# Patient Record
Sex: MALE
[De-identification: names, ages, dates, MRNs, and addresses within clinical notes are randomized per-mention and may not be internally consistent; named-entity substitution may affect disease eponyms.]

---

## 2021-02-13 ENCOUNTER — HOSPITAL ENCOUNTER (EMERGENCY)
Dept: HOSPITAL 56 - MW.ED | Age: 41
Discharge: HOME | End: 2021-02-13
Payer: COMMERCIAL

## 2021-02-13 DIAGNOSIS — Z20.822: ICD-10-CM

## 2021-02-13 DIAGNOSIS — F17.210: ICD-10-CM

## 2021-02-13 DIAGNOSIS — K80.70: Primary | ICD-10-CM

## 2021-02-13 LAB
BUN SERPL-MCNC: 17 MG/DL (ref 7–18)
CHLORIDE SERPL-SCNC: 106 MMOL/L (ref 98–107)
CO2 SERPL-SCNC: 24.9 MMOL/L (ref 21–32)
FLUAV RNA UPPER RESP QL NAA+PROBE: NEGATIVE
FLUBV RNA UPPER RESP QL NAA+PROBE: NEGATIVE
GLUCOSE SERPL-MCNC: 128 MG/DL (ref 74–106)
LIPASE SERPL-CCNC: 89 U/L (ref 73–393)
POTASSIUM SERPL-SCNC: 3.9 MMOL/L (ref 3.5–5.1)
SARS-COV-2 RNA RESP QL NAA+PROBE: NEGATIVE
SODIUM SERPL-SCNC: 142 MMOL/L (ref 136–148)

## 2021-02-13 PROCEDURE — 81003 URINALYSIS AUTO W/O SCOPE: CPT

## 2021-02-13 PROCEDURE — 85025 COMPLETE CBC W/AUTO DIFF WBC: CPT

## 2021-02-13 PROCEDURE — C9113 INJ PANTOPRAZOLE SODIUM, VIA: HCPCS

## 2021-02-13 PROCEDURE — 84484 ASSAY OF TROPONIN QUANT: CPT

## 2021-02-13 PROCEDURE — 96365 THER/PROPH/DIAG IV INF INIT: CPT

## 2021-02-13 PROCEDURE — 0240U: CPT

## 2021-02-13 PROCEDURE — 76705 ECHO EXAM OF ABDOMEN: CPT

## 2021-02-13 PROCEDURE — 36415 COLL VENOUS BLD VENIPUNCTURE: CPT

## 2021-02-13 PROCEDURE — 74177 CT ABD & PELVIS W/CONTRAST: CPT

## 2021-02-13 PROCEDURE — 93005 ELECTROCARDIOGRAM TRACING: CPT

## 2021-02-13 PROCEDURE — 83690 ASSAY OF LIPASE: CPT

## 2021-02-13 PROCEDURE — 96361 HYDRATE IV INFUSION ADD-ON: CPT

## 2021-02-13 PROCEDURE — 96375 TX/PRO/DX INJ NEW DRUG ADDON: CPT

## 2021-02-13 PROCEDURE — 99284 EMERGENCY DEPT VISIT MOD MDM: CPT

## 2021-02-13 PROCEDURE — 80053 COMPREHEN METABOLIC PANEL: CPT

## 2021-02-13 NOTE — US
INDICATION:



Abdominal pain. CT scan earlier showing possible acute cholecystitis.



COMPARISON:



CT of the abdomen and pelvis 02/13/2021.



TECHNIQUE:



Real time gray scale imaging and color Doppler analysis was performed of 

the right upper quadrant.



FINDINGS:



Liver: The liver is normal in size measuring 14.2 cm in length. Increased 

echogenicity compatible with hepatic steatosis. No focal liver lesions.



Gallbladder: There are multiple stones within the gallbladder. The 

gallbladder wall is mildly thickened. No pericholecystic fluid. Positive 

sonographic Mcclain sign.



Bile ducts: No biliary dilation. The common bile duct measures 5 mm in 

diameter.



Pancreas: Normal where seen.



Right kidney: The right kidney measures 10.7 cm in length. No 

hydronephrosis, calculus, or mass.



Right adrenal gland: There is a 2.9 cm solid hyperechoic lesion in the 

right adrenal gland. No internal vascularity. This corresponds with the 

benign myelolipoma seen on CT. 



IMPRESSION:



1. Cholelithiasis with mild gallbladder wall thickening and positive 

sonographic Mcclain sign. Findings are compatible with acute cholecystitis. 



2. Diffuse hepatic steatosis. 



3. 2.9 cm benign myelolipoma in the right adrenal gland.



Dictated by Janessa Burk MD @ Feb 13 2021 11:22PM



Signed by Dr. Janessa Burk @ Feb 13 2021 11:28PM

## 2021-02-13 NOTE — EDM.PDOC
<Elvis Kovacs - Last Filed: 02/13/21 23:39>





ED HPI GENERAL MEDICAL PROBLEM





- General


Chief Complaint: Gastrointestinal Problem


Stated Complaint: ABD PAIN / VOMITING


Time Seen by Provider: 02/13/21 18:15





- Related Data


                                    Allergies











Allergy/AdvReac Type Severity Reaction Status Date / Time


 


No Known Allergies Allergy   Verified 02/13/21 17:57











Home Meds: 


                                    Home Meds





. [No Known Home Meds]  02/13/21 [History]











Departure





- Departure


Time of Disposition: 23:41


Disposition: Home, Self-Care 01


Condition: Good


Clinical Impression: 


Cholelithiasis


Qualifiers:


 Cholelithiasis location: other site Biliary obstruction: without biliary 

obstruction Qualified Code(s): K80.80 - Other cholelithiasis without obstruction








- Discharge Information


*PRESCRIPTION DRUG MONITORING PROGRAM REVIEWED*: Not Applicable


*COPY OF PRESCRIPTION DRUG MONITORING REPORT IN PATIENT HANG: Not Applicable


Instructions:  Cholelithiasis


Referrals: 


Garret Diamond MD [Physician] - 2 Days


Forms:  ED Department Discharge


Additional Instructions: 


Please avoid fatty foods. Please call Dr. Diamond's office on Monday morning to 

arrange a follow-up appointment.





The following information is given to patients seen in the emergency department 

who are being discharged to home. This information is to outline your options 

for follow-up care. We provide all patients seen in our emergency department 

with a follow-up referral.





The need for follow-up, as well as the timing and circumstances, are variable 

depending upon the specifics of your emergency department visit.





If you don't have a primary care physician on staff, we will provide you with a 

referral. We always advise you to contact your personal physician following an 

emergency department visit to inform them of the circumstance of the visit and 

for follow-up with them and/or the need for any referrals to a consulting 

specialist.





The emergency department will also refer you to a specialist when appropriate. 

This referral assures that you have the opportunity for follow-up care with a 

specialist. All of these measure are taken in an effort to provide you with 

optimal care, which includes your follow-up.





Under all circumstances we always encourage you to contact your private 

physician who remains a resource for coordinating your care. When calling for 

follow-up care, please make the office aware that this follow-up is from your 

recent emergency room visit. If for any reason you are refused follow-up, please

 contact the [facility] Emergency 

Department at [phone] and asked to speak to the emergency department 

charge nurse.





- Assessment/Plan


Assessment:: 





Pt received in sign out from prior provider. US with borderline thickening.  Pt 

remains w/out sx in the ED. Pt was evaluated by Dr. Diamond of general surgery in 

the ED.  Given lack of active sx, very minimal leukocytosis and borderline 

imaging and after discussion between Dr. Diamond and the patient plan will be for 

dc with outpatient f/u.





<June Bell - Last Filed: 02/14/21 19:06>





ED HPI GENERAL MEDICAL PROBLEM





- General


Source of Information: Reports: Patient


History Limitations: Reports: No Limitations





- History of Present Illness


INITIAL COMMENTS - FREE TEXT/NARRATIVE: 


HISTORY AND PHYSICAL:





History of present illness:


Is a 40-year-old male presenting to the ED for RUQ abdominal pain x4 hours.  

Patient works nights and notes eating breakfast at 0800 this morning (02/13/21).

  Patient went to bed and then was awoken at 1500 with RUQ abdominal pain and 

feeling nauseous. Patient reports forcing himself to vomit 7 times to relieve 

his symptoms and continues to feel nauseous.  Patient has not eaten or drank 

since 8am today and states symptoms are improving without eating, but feels 

eating may worsen symptoms.  Patient has a h/o of alcohol abuse but states he 

does not drink much anymore as he is dedicated to his work. Denies any other 

symptoms or concerns. Admits to smoking three quarters of a pack of cigarettes 

per day x17 years and admits to drinking a sixpack of light beer per week. 





Patient denies fever, chills, chest pain, shortness of breath, or cough. Denies 

headache, neck stiff ness, change in vision, syncope, or near syncope. Denies 

diarrhea, constipation, or dysuria. Has not noted any blood in urine or stool.





Review of systems: 


As per history of present illness and below otherwise all systems reviewed and 

negative.





Past medical history: 


As per history of present illness and as reviewed below otherwise 

noncontributory.





Surgical history: 


As per history of present illness and as reviewed below otherwise 

noncontributory.





Social history: 


See social history for further information





Family history: 


As per history of present illness and as reviewed below otherwise 

noncontributory.





Physical exam:


General: Patient is alert, oriented, and in no acute distress. Patient laying 

comfortably on exam table. Vitals stable and reviewed by me.


HEENT: Atraumatic, normocephalic, pupils equal and reactive bilaterally, 

negative for conjunctival pallor or scleral icterus, mucous membranes moist, 

neck supple, nontender, trachea midline. No drooling or trismus noted. No 

meningeal signs. No hot potato voice noted. 


Lungs: Clear to auscultation, breath sounds equal bilaterally, chest nontender.


Heart: S1S2, regular rate and rhythm without overt murmur


Abdomen:  Soft, nondistended, RUQ tenderness to palpation, negative rebound. 

Negative for masses or hepatosplenomegaly. Negative for costovertebral 

tenderness.


Genitourinary: Deferred.


Rectal: Deferred.


Skin: Intact, warm, dry. No lesions or rashes noted.


Extremities: Atraumatic, negative for cords or calf pain. Neurovascular 

unremarkable. Cap refill WNL


Neuro: Awake, alert, oriented. Cranial nerves II through XII unremarkable. 

Cerebellum unremarkable. Motor and sensory unremarkable throughout. Exam 

nonfocal.





Notes:


Patient declines pain medications at this time.


I did call and speak to the General Surgeon on call, Dr. Diamond, and thoroughly 

discussed patients case, he requests a RUQ US performed and to call him back 

with results of US. 


Dr. Kovacs has assumed care of patient and will follow remaining diagnostics and

 disposition for patient.





Diagnostics:


CBC, CMP, lipase, ECG, Trop, Abd/Pelvic CT w cont, RUQ US, COVID 19





Therapeutics:


NS, Protonix, Rocephin





Impression:


RUQ abdominal pain





Plan:








Definitive disposition and diagnosis as appropriate pending reevaluation and 

review of above.





  ** abdominal


Pain Score (Numeric/FACES): 3





Past Medical History





- Past Health History


Medical/Surgical History: Denies Medical/Surgical History


HEENT History: Reports: None


Cardiovascular History: Reports: None


Respiratory History: Reports: None


Gastrointestinal History: Reports: None


Genitourinary History: Reports: None


Musculoskeletal History: Reports: None


Neurological History: Reports: None


Psychiatric History: Reports: None


Endocrine/Metabolic History: Reports: None


Dermatologic History: Reports: None





- Infectious Disease History


Infectious Disease History: Reports: None





Social & Family History





- Family History


Family Medical History: No Pertinent Family History





- Tobacco Use


Packs/Tins Daily: 1





- Caffeine Use


Caffeine Use: Reports: Coffee, Energy Drinks





- Recreational Drug Use


Recreational Drug Use Frequency: Socially





ED ROS GENERAL





- Review of Systems


Review Of Systems: Comprehensive ROS is negative, except as noted in HPI.





ED EXAM, GENERAL





- Physical Exam


Exam: See Below (see dictation)





Course





- Vital Signs


Last Recorded V/S: 


                                Last Vital Signs











Temp  97.1 F   02/13/21 21:55


 


Pulse  68   02/13/21 21:55


 


Resp  18   02/13/21 21:55


 


BP  115/79   02/13/21 21:55


 


Pulse Ox  97   02/13/21 21:55














- Orders/Labs/Meds


Orders: 


                               Active Orders 24 hr











 Category Date Time Status


 


 Saline Lock Insert [OM.PC] Stat Oth  02/13/21 18:52 Ordered











Labs: 


                                Laboratory Tests











  02/13/21 02/13/21 02/13/21 Range/Units





  18:08 18:08 19:30 


 


WBC  11.34 H    (4.0-11.0)  K/uL


 


RBC  5.17    (4.50-5.90)  M/uL


 


Hgb  16.9    (13.0-17.0)  g/dL


 


Hct  48.3    (38.0-50.0)  %


 


MCV  93.4    (80.0-98.0)  fL


 


MCH  32.7 H    (27.0-32.0)  pg


 


MCHC  35.0    (31.0-37.0)  g/dL


 


RDW Std Deviation  42.4    (28.0-62.0)  fl


 


RDW Coeff of Emerson  12    (11.0-15.0)  %


 


Plt Count  255    (150-400)  K/uL


 


MPV  11.90    (7.40-12.00)  fL


 


Neut % (Auto)  70.8    (48.0-80.0)  %


 


Lymph % (Auto)  22.0    (16.0-40.0)  %


 


Mono % (Auto)  5.2    (0.0-15.0)  %


 


Eos % (Auto)  1.8    (0.0-7.0)  %


 


Baso % (Auto)  0.2    (0.0-1.5)  %


 


Neut # (Auto)  8.0 H    (1.4-5.7)  K/uL


 


Lymph # (Auto)  2.5 H    (0.6-2.4)  K/uL


 


Mono # (Auto)  0.6    (0.0-0.8)  K/uL


 


Eos # (Auto)  0.2    (0.0-0.7)  K/uL


 


Baso # (Auto)  0.0    (0.0-0.1)  K/uL


 


Nucleated RBC %  0.0    /100WBC


 


Nucleated RBCs #  0    K/uL


 


Sodium   142   (136-148)  mmol/L


 


Potassium   3.9   (3.5-5.1)  mmol/L


 


Chloride   106   ()  mmol/L


 


Carbon Dioxide   24.9   (21.0-32.0)  mmol/L


 


BUN   17   (7.0-18.0)  mg/dL


 


Creatinine   1.2   (0.8-1.3)  mg/dL


 


Est Cr Clr Drug Dosing   78.03   mL/min


 


Estimated GFR (MDRD)   > 60.0   ml/min


 


Glucose   128 H   ()  mg/dL


 


Calcium   9.5   (8.5-10.1)  mg/dL


 


Total Bilirubin   0.3   (0.2-1.0)  mg/dL


 


AST   26   (15-37)  IU/L


 


ALT   54   (14-63)  IU/L


 


Alkaline Phosphatase   76   ()  U/L


 


Troponin I   < 0.050   (0.000-0.056)  ng/mL


 


Total Protein   7.4   (6.4-8.2)  g/dL


 


Albumin   4.0   (3.4-5.0)  g/dL


 


Globulin   3.4   (2.6-4.0)  g/dL


 


Albumin/Globulin Ratio   1.2   (0.9-1.6)  


 


Lipase   89   ()  U/L


 


Urine Color    YELLOW  


 


Urine Appearance    CLEAR  


 


Urine pH    6.5  (5.0-8.0)  


 


Ur Specific Gravity    1.025  (1.001-1.035)  


 


Urine Protein    NEGATIVE  (NEGATIVE)  mg/dL


 


Urine Glucose (UA)    NEGATIVE  (NEGATIVE)  mg/dL


 


Urine Ketones    NEGATIVE  (NEGATIVE)  mg/dL


 


Urine Occult Blood    NEGATIVE  (NEGATIVE)  


 


Urine Nitrite    NEGATIVE  (NEGATIVE)  


 


Urine Bilirubin    NEGATIVE  (NEGATIVE)  


 


Urine Urobilinogen    0.2  (<2.0)  EU/dL


 


Ur Leukocyte Esterase    NEGATIVE  (NEGATIVE)  


 


Influenza Type A RNA     (NEGATIVE)  


 


Influenza Type B RNA     (NEGATIVE)  


 


SARS-CoV-2 RNA (SALTY)     (NEGATIVE)  














  02/13/21 Range/Units





  21:20 


 


WBC   (4.0-11.0)  K/uL


 


RBC   (4.50-5.90)  M/uL


 


Hgb   (13.0-17.0)  g/dL


 


Hct   (38.0-50.0)  %


 


MCV   (80.0-98.0)  fL


 


MCH   (27.0-32.0)  pg


 


MCHC   (31.0-37.0)  g/dL


 


RDW Std Deviation   (28.0-62.0)  fl


 


RDW Coeff of Emerson   (11.0-15.0)  %


 


Plt Count   (150-400)  K/uL


 


MPV   (7.40-12.00)  fL


 


Neut % (Auto)   (48.0-80.0)  %


 


Lymph % (Auto)   (16.0-40.0)  %


 


Mono % (Auto)   (0.0-15.0)  %


 


Eos % (Auto)   (0.0-7.0)  %


 


Baso % (Auto)   (0.0-1.5)  %


 


Neut # (Auto)   (1.4-5.7)  K/uL


 


Lymph # (Auto)   (0.6-2.4)  K/uL


 


Mono # (Auto)   (0.0-0.8)  K/uL


 


Eos # (Auto)   (0.0-0.7)  K/uL


 


Baso # (Auto)   (0.0-0.1)  K/uL


 


Nucleated RBC %   /100WBC


 


Nucleated RBCs #   K/uL


 


Sodium   (136-148)  mmol/L


 


Potassium   (3.5-5.1)  mmol/L


 


Chloride   ()  mmol/L


 


Carbon Dioxide   (21.0-32.0)  mmol/L


 


BUN   (7.0-18.0)  mg/dL


 


Creatinine   (0.8-1.3)  mg/dL


 


Est Cr Clr Drug Dosing   mL/min


 


Estimated GFR (MDRD)   ml/min


 


Glucose   ()  mg/dL


 


Calcium   (8.5-10.1)  mg/dL


 


Total Bilirubin   (0.2-1.0)  mg/dL


 


AST   (15-37)  IU/L


 


ALT   (14-63)  IU/L


 


Alkaline Phosphatase   ()  U/L


 


Troponin I   (0.000-0.056)  ng/mL


 


Total Protein   (6.4-8.2)  g/dL


 


Albumin   (3.4-5.0)  g/dL


 


Globulin   (2.6-4.0)  g/dL


 


Albumin/Globulin Ratio   (0.9-1.6)  


 


Lipase   ()  U/L


 


Urine Color   


 


Urine Appearance   


 


Urine pH   (5.0-8.0)  


 


Ur Specific Gravity   (1.001-1.035)  


 


Urine Protein   (NEGATIVE)  mg/dL


 


Urine Glucose (UA)   (NEGATIVE)  mg/dL


 


Urine Ketones   (NEGATIVE)  mg/dL


 


Urine Occult Blood   (NEGATIVE)  


 


Urine Nitrite   (NEGATIVE)  


 


Urine Bilirubin   (NEGATIVE)  


 


Urine Urobilinogen   (<2.0)  EU/dL


 


Ur Leukocyte Esterase   (NEGATIVE)  


 


Influenza Type A RNA  NEGATIVE  (NEGATIVE)  


 


Influenza Type B RNA  NEGATIVE  (NEGATIVE)  


 


SARS-CoV-2 RNA (SALTY)  NEGATIVE  (NEGATIVE)  











Meds: 


Medications














Discontinued Medications














Generic Name Dose Route Start Last Admin





  Trade Name Freq  PRN Reason Stop Dose Admin


 


Sodium Chloride  1,000 mls @ 999 mls/hr  02/13/21 18:53  02/13/21 19:03





  Normal Saline  IV  02/13/21 19:53  999 mls/hr





  STAT ONE   Administration


 


Pantoprazole Sodium 80 mg/  20 mls @ 420 mls/hr  02/13/21 18:54  02/13/21 19:05





  Sodium Chloride  IVPUSH  02/13/21 18:56  420 mls/hr





  ONETIME ONE   Administration


 


Ceftriaxone Sodium/Dextrose 1  50 mls @ 100 mls/hr  02/13/21 21:09  02/13/21 

21:19





  gm/ Premix  IV  02/13/21 21:38  100 mls/hr





  ONETIME ONE   Administration


 


Sodium Chloride  1,000 mls @ 125 mls/hr  02/13/21 21:30  02/13/21 21:30





  Normal Saline  IV  02/14/21 05:29  125 mls/hr





  NOW STA   Administration


 


Iopamidol  100 ml  02/13/21 20:22  02/13/21 20:22





  Isovue Multipack-370 (76%)  IVPUSH  02/13/21 20:23  100 ml





  ONETIME STA   Administration


 


Nicotine  14 mg  02/13/21 21:48  02/13/21 21:53





  Habitrol  TRDERM  02/13/21 21:49  14 mg





  ONETIME ONE   Administration


 


Ondansetron HCl  4 mg  02/13/21 18:53  02/13/21 19:04





  Zofran  IVPUSH  02/13/21 18:54  4 mg





  ONETIME ONE   Administration


 


Sodium Chloride  10 ml  02/13/21 18:52 





  Saline Flush  FLUSH  





  ASDIRECTED PRN  





  Keep Vein Open  


 


Sodium Chloride  2.5 ml  02/13/21 18:52 





  Saline Flush  FLUSH  





  ASDIRECTED PRN  





  Keep Vein Open  














Sepsis Event Note (ED)





- Evaluation


Sepsis Screening Result: No Definite Risk





- My Orders


Last 24 Hours: 


My Active Orders





02/13/21 18:52


Saline Lock Insert [OM.PC] Stat 














- Assessment/Plan


Last 24 Hours: 


My Active Orders





02/13/21 18:52


Saline Lock Insert [OM.PC] Stat

## 2021-02-13 NOTE — CT
INDICATION:



Lower abdominal pain 



TECHNIQUE:



CT abdomen and pelvis acquired with IV contrast. 100 mL of Isovue 370 

administered. 



COMPARISON:



None available 



FINDINGS:



Lower chest: Unremarkable.  



Liver: Mild hepatic steatosis. 



Spleen: Unremarkable.  



Pancreas: Unremarkable.  



Gallbladder and bile ducts: Cholelithiasis with mild pericholecystic edema 

and stranding. 



Adrenal glands: A 2.7 x 2.7 cm predominantly fat attenuation right adrenal 

lesion, containing ill-defined soft tissue density, consistent with a 

myelolipoma. 



Kidneys: No hydronephrosis. A subcentimeter right renal low-density lesion, 

statistically a small cyst. 



GI tract: Unremarkable.  Appendix is normal.  



Vascular structures: Unremarkable.  



Lymph nodes: Unremarkable.  



Miscellaneous: No free fluid or free air. A very small fat containing 

paraumbilical hernia. 



Pelvic Organs: Grossly unremarkable prostate. Mild bladder wall thickening. 

Bones: Degenerative changes at the lumbosacral junction with a prominent 

disc osteophyte complex. 



IMPRESSION:



Cholelithiasis with mild pericholecystic edema and stranding. Correlate 

clinically and with sonography for acute cholecystitis.



Mild bladder wall thickening. Correlate for cystitis. 



A 2.7 cm right adrenal myelolipoma. 



Mild hepatic steatosis.



Please note that all CT scans at this facility use dose modulation, 

iterative reconstruction, and/or weight-based dosing when appropriate to 

reduce radiation dose to as low as reasonably achievable.



Dictated by Dennis Toussaint MD @ Feb 13 2021  8:42PM



Signed by Dr. Dennis Toussaint @ Feb 13 2021  8:52PM

## 2021-02-14 NOTE — ER
HISTORY OF PRESENT ILLNESS:

The patient is a pleasant 40-year-old gentleman, who said this morning he ate

some __________ and went to bed.  He said he woke up with some more midabdominal

pain.  He thought this was food poisoning, so he forced himself to vomit several

times.  The pain was starting to dissipate, and he was feeling better, but he

was worried about food poisoning, so he came to the hospital for further

evaluation.  The patient did have a CT scan that showed a gallstone with mild

pericystic edema and stranding.  He then had an ultrasound, which again showed

some multiple stones.  The gallbladder wall was mildly thickened, but there was

no pericholecystic fluid.  The ultrasound tech reported a positive Mcclain sign.

However, when I speak with the patient, he denies any pain or discomfort with

ultrasound being done.

 

Currently, the patient says he feels good.  He has no nausea or vomiting.  He

has no abdominal pain.  The patient says he has never had an episode like this

in the past.

 

The patient does work the night shifts.

 

PAST MEDICAL HISTORY:

The patient denies any.

 

CURRENT HOME MEDICATION:

The patient denies any.

 

PAST SURGICAL HISTORY:

The patient denies any.

 

FAMILY HISTORY:

The patient denies any family history of cancer, diabetes, or heart disease.

 

SOCIAL HISTORY:

The patient smokes 3/4 of a pack of cigarettes per day.  He denies illicit drug

use.  He drinks about a 6-pack every 2 weeks.  He said a couple of months ago,

he had a much higher alcohol intake, but he has been quitting.

 

REVIEW OF SYSTEMS:

A complete 12+ review of systems was done and was negative, except for what is

in the HPI.

 

IMAGING:

As per HPI.

 

LABORATORY DATA:

White cell count is 11.3, hemoglobin is 16.9, and platelet count is 255.  Sodium

142, potassium 3.9, chloride 106, bicarb is 24.9, BUN 17, creatinine 1.2, and

glucose is 128.  Total bilirubin 0.3, AST is 26, ALT is 54, and alkaline

phosphatase is 76.  Lipase is 89.

 

PHYSICAL EXAMINATION:

GENERAL:  The patient is lying comfortably in an ER bed.  He is alert, oriented,

and in no acute distress.

VITAL SIGNS:  Temperature is 97.1, pulse is 68, blood pressure is 115/79, and

saturating 95% on room air.

HEENT:  Head is normocephalic and atraumatic.  Mouth:  He is wearing a mask.

LUNGS:  Clear to auscultation bilaterally.  No rhonchi or wheezing is heard.

HEART:  Regular rate and rhythm.  No murmur is appreciated.

ABDOMEN:  Soft, nontender, and nondistended.

EXTREMITIES:  No edema.

NEUROLOGICAL:  Grossly, no motor or neurologic deficits are noted.

 

ASSESSMENT AND PLAN:

This is a pleasant 40-year-old gentleman, who has symptomatic cholelithiasis

with biliary colic.  I went over with the patient what a gallbladder was.  I

went over laparoscopic cholecystectomy.  I went over the risks, goals, and

alternatives of the procedure, which include, but are not limited to, bleeding,

infection, bile leak, injury to the common bile duct or duodenum, need to

convert to open, hernia formation, and retained gallstones.  The patient

understands.  Since the patient has no LFT elevation, no pericholecystic fluid,

and no abdominal pain, this likely is symptomatic cholelithiasis.  I went over

with the patient that this could be done as an outpatient rather than admission

and urgent surgery.  The patient agrees.  He would like to discharge and follow

up in clinic for an outpatient cholecystectomy.  I did go over that if he has

another attack or the pain comes back, he may need to come back to the emergency

room.  The patient understands.  Meanwhile, the patient is to stay with a low-

fat diet until he gets his gallbladder out.  All of the patient's questions were

answered.  I did discuss the case with the emergency room physician.

 

 

BINH GÓMEZ

DD:  02/13/2021 23:41:47

DT:  02/14/2021 03:12:33

Job #:  [id number]

## 2021-04-28 ENCOUNTER — HOSPITAL ENCOUNTER (OUTPATIENT)
Dept: HOSPITAL 56 - MW.SDS | Age: 41
Discharge: HOME | End: 2021-04-28
Attending: SURGERY
Payer: COMMERCIAL

## 2021-04-28 DIAGNOSIS — K80.10: Primary | ICD-10-CM

## 2021-04-28 DIAGNOSIS — F17.210: ICD-10-CM

## 2021-04-28 PROCEDURE — 47562 LAPAROSCOPIC CHOLECYSTECTOMY: CPT

## 2021-04-28 PROCEDURE — 88304 TISSUE EXAM BY PATHOLOGIST: CPT

## 2021-04-28 RX ADMIN — FENTANYL CITRATE PRN MCG: 50 INJECTION, SOLUTION INTRAMUSCULAR; INTRAVENOUS at 09:56

## 2021-04-28 RX ADMIN — FENTANYL CITRATE PRN MCG: 50 INJECTION, SOLUTION INTRAMUSCULAR; INTRAVENOUS at 10:03

## 2021-04-28 NOTE — PCM.OPNOTE
- General Post-Op/Procedure Note


Date of Surgery/Procedure: 04/28/21


Operative Procedure(s): laparoscopic cholecystectomy


Findings: 





gallbladder with stones and adhesions





dictation number #[id number]


Pre Op Diagnosis: symptomatic cholelithiasis


Post-Op Diagnosis: symptomatic cholelithiasis


Anesthesia Technique: General ET Tube


Primary Surgeon: Garret iDamond


Pathology: 





gallbladder





EBL in mLs: 5


Complications: None


Condition: Good

## 2021-04-28 NOTE — PCM.PREANE
Preanesthetic Assessment





- Anesthesia/Transfusion/Family Hx


Anesthesia History: Prior Anesthesia Without Reaction


Family History of Anesthesia Reaction: No


Transfusion History: No Prior Transfusion(s)


Intubation History: Unknown





- Review of Systems


General: No Symptoms


Pulmonary: No Symptoms


Cardiovascular: No Symptoms


Gastrointestinal: No Symptoms


Neurological: No Symptoms


Other: Reports: None





- Physical Assessment


NPO Status Date: 04/28/21


NPO Status Time: 00:00


Height: 5 ft 9 in


Weight: 202 lb


ASA Class: 2


Mental Status: Alert & Oriented x3


Dentition: Reports: Normal Dentition


ROM/Head Extension: Full


Lungs: Clear to Auscultation, Normal Respiratory Effort


Cardiovascular: Regular Rate, Regular Rhythm





- Allergies


Allergies/Adverse Reactions: 


                                    Allergies











Allergy/AdvReac Type Severity Reaction Status Date / Time


 


No Known Allergies Allergy   Verified 04/27/21 09:04














- Blood


Blood Available: No





- Acknowledgements


Anesthesia Type Planned: General Anesthesia


Pt an Appropriate Candidate for the Planned Anesthesia: Yes


Alternatives and Risks of Anesthesia Discussed w Pt/Guardian: Yes


Pt/Guardian Understands and Agrees with Anesthesia Plan: Yes





PreAnesthesia Questionnaire





- Past Health History


Medical/Surgical History: Denies Medical/Surgical History


HEENT History: Reports: None


Cardiovascular History: Reports: None


Respiratory History: Reports: None


Gastrointestinal History: Reports: None


Other Gastrointestinal History: symptomatic cholelithiasis


Genitourinary History: Reports: None


Musculoskeletal History: Reports: None


Neurological History: Reports: None


Psychiatric History: Reports: None


Endocrine/Metabolic History: Reports: None


Hematologic History: Reports: None


Immunologic History: Reports: None


Oncologic (Cancer) History: Reports: None


Dermatologic History: Reports: None





- Infectious Disease History


Infectious Disease History: Reports: None





- Past Surgical History


Head Surgeries/Procedures: Reports: None


HEENT Surgical History: Reports: Oral Surgery


Other HEENT Surgeries/Procedures: wisdom teeth extraction


Cardiovascular Surgical History: Reports: None


Respiratory Surgical History: Reports: None


GI Surgical History: Reports: None


Male  Surgical History: Reports: None


Endocrine Surgical History: Reports: None


Neurological Surgical History: Reports: None


Musculoskeletal Surgical History: Reports: None


Oncologic Surgical History: Reports: None


Dermatological Surgical History: Reports: None





- SUBSTANCE USE


Tobacco Use Status *Q: Current Every Day Tobacco User


Tobacco Use Within Last Twelve Months: Cigarettes





- HOME MEDS


Home Medications: 


                                    Home Meds





. [No Known Home Meds]  02/13/21 [History]











- CURRENT (IN HOUSE) MEDS


Current Meds: 





                               Current Medications





Lactated Ringer's (Ringers, Lactated)  1,000 mls @ 125 mls/hr IV ASDIRECTED RADHA





Discontinued Medications





Dexamethasone (Dexamethasone 4 Mg/Ml 5 Ml Mdv) Confirm Administered Dose 20 mg 

.ROUTE .STK-MED ONE


   Stop: 04/28/21 07:02


Fentanyl (Fentanyl 100 Mcg/2 Ml Sdv) Confirm Administered Dose 100 mcg .ROUTE 

.STK-MED ONE


   Stop: 04/28/21 07:03


Cefoxitin Sodium 2 gm/ Premix  50 mls @ 100 mls/hr IV ONETIME ONE


   Stop: 04/26/21 09:51


Acetaminophen 1,000 mg/ Premix  100 mls @ 400 mls/hr IV NOW ONE


   Stop: 04/26/21 09:36


Acetaminophen (Ofirmev 1000 Mg/100 Ml) Confirm Administered Dose 100 mls @ as 

directed .ROUTE .STK-MED ONE


   Stop: 04/28/21 07:02


Ketorolac Tromethamine (Ketorolac 30 Mg/Ml Sdv) Confirm Administered Dose 30 mg 

.ROUTE .STK-MED ONE


   Stop: 04/28/21 07:02


Lidocaine HCl (Lidocaine 1% 5 Ml Sdv) Confirm Administered Dose 5 ml .ROUTE 

.STK-MED ONE


   Stop: 04/28/21 07:02


Morphine Sulfate (Morphine 10 Mg/Ml Syringe) Confirm Administered Dose 10 mg 

.ROUTE .STK-MED ONE


   Stop: 04/28/21 07:03


Ondansetron HCl (Ondansetron 4 Mg/2 Ml Sdv) Confirm Administered Dose 8 mg 

.ROUTE .STK-MED ONE


   Stop: 04/28/21 07:02


Pregabalin (Pregabalin 75 Mg Cap)  150 mg PO DAILY ONE


   Stop: 04/27/21 09:01


Propofol (Propofol 200 Mg/20 Ml Sdv) Confirm Administered Dose 200 mg .ROUTE 

.STK-MED ONE


   Stop: 04/28/21 07:02


Rocuronium Bromide (Rocuronium Bromide 50 Mg/5 Ml Syringe) Confirm Administered 

Dose 50 mg .ROUTE .STK-MED ONE


   Stop: 04/28/21 07:02


Sugammadex Sodium (Sugammadex Sodium 200 Mg/2 Ml Vial) Confirm Administered Dose

 200 mg .ROUTE .STK-MED ONE


   Stop: 04/28/21 07:02

## 2021-04-28 NOTE — PCM48HPAN
Post Anesthesia Note





- EVALUATION WITHIN 48HRS OF ANESTHETIC


Vital Signs in Normal Range: Yes


Patient Participated in Evaluation: Yes


Respiratory Function Stable: Yes


Airway Patent: Yes


Cardiovascular Function Stable: Yes


Hydration Status Stable: Yes


Pain Control Satisfactory: Yes


Nausea and Vomiting Control Satisfactory: Yes


Mental Status Recovered: Yes


Vital Signs: 


                                Last Vital Signs











Temp  97.7 F   04/28/21 06:50


 


Pulse  83   04/28/21 06:50


 


Resp  15   04/28/21 06:50


 


BP  124/77   04/28/21 06:50


 


Pulse Ox  96   04/28/21 06:50

## 2021-04-28 NOTE — PCM.POSTAN
POST ANESTHESIA ASSESSMENT





- MENTAL STATUS


Mental Status: Alert, Oriented





- VITAL SIGNS


Vital Signs: 


                                Last Vital Signs











Temp  97.7 F   04/28/21 06:50


 


Pulse  83   04/28/21 06:50


 


Resp  15   04/28/21 06:50


 


BP  124/77   04/28/21 06:50


 


Pulse Ox  96   04/28/21 06:50














- RESPIRATORY


Respiratory Status: Respiratory Rate WNL, Airway Patent, O2 Saturation Stable





- CARDIOVASCULAR


CV Status: Pulse Rate WNL, Blood Pressure Stable





- GASTROINTESTINAL


GI Status: No Symptoms





- POST OP HYDRATION


Hydration Status: Adequate & Stable

## 2021-04-28 NOTE — OR
SURGEON:

TANVIR ZABALA MD

 

DATE OF PROCEDURE:  04/28/2021

 

PREOPERATIVE DIAGNOSIS:

Symptomatic cholelithiasis.

 

POSTOPERATIVE DIAGNOSIS:

Symptomatic cholelithiasis.

 

PROCEDURE PERFORMED:

Laparoscopic cholecystectomy.

 

ANESTHESIA:

General.

 

ESTIMATED BLOOD LOSS:

5 mL.

 

SPECIMEN:

Gallbladder.

 

REASON FOR PROCEDURE:

The patient is a pleasant 40-year-old gentleman who has had some right upper

quadrant pain, last one was after eating [employer].  Did have ultrasound which

showed some thickened gallbladder wall with multiple gallstones.  No LFT

elevations.  The patient says he has not had any further attacks since I have

seen him last.

 

We did go over with the patient risks, goals, and alternatives of the procedure.

Risks include but not limited to bleeding, infection, bile leak; injury to

nearby structures, common bile duct, or duodenum; hernia formation; need to

convert to open; retained gallstones; change in bowel habits such as diarrhea,

and that this could be something other than gallbladder causing his pain.  The

patient understands and wishes to proceed.

 

OPERATIVE NARRATIVE:

The patient was brought to the OR.  He was prepped and draped in usual sterile

fashion.  SCDs were placed.  Preoperative antibiotics were given and anesthesia

by the Anesthesia team.

 

After time-out was performed, infraumbilical incision was made.  This was

carried down to the fascia.  The fascia was then entered in an open Ricki

technique.  Ricki trocar was placed.  Insufflation was began.  Abdomen was

inspected.  No entry injury was noted.  Did have some adhesions down in his

lower right pelvis.  Now three more 5 mm trocars were placed, one in the

midepigastric, one in the midclavicular subcostal area, and one more in the

right lower abdomen.  Now, the fundus of the gallbladder was grasped and

elevated.  The patient did have some omental adhesions to the gallbladder and

these were gently taken down.  The patient's gallbladder was all scarred in.

With some gentle mainly blunt dissection able to take down these adhesions.  The

duodenum and colon did sweep up very close to the gallbladder.  Again, these

were taken down with blunt dissection.  Good hemostasis.  Did get a good

critical view with the cystic duct and cystic artery.

 

The patient had been injected with indocyanine green which did show good cystic

duct even going into the common bile duct junction.  He was injected with

another dose of contrast and the cystic artery lit up as expected.  Now, the

cystic duct and cystic artery were clipped and transected.  The gallbladder was

taken off the gallbladder fossa with Harmonic scalpel.  There was good

hemostasis.  No other tubular structures were encountered.  The gallbladder was

then placed in EndoCatch bag.  Now, the operative site was inspected and some

suction irrigation.  There was good hemostasis.  Clips appeared to be still in

good position and intact.  Now, the pneumoperitoneum was released and EndoCatch

bag with the gallbladder was removed.  Did still have some stones in the

gallbladder.  Now, the 5 mm trocar was also removed.

 

The fascia was closed with a figure-of-eight 0 Vicryl.  All the trocar sites

were again injected with the rest of the local.  With the injection of local,

they were then closed with 4-0 Monocryl and Dermabond.

 

At the end of the case, sponge and needle counts were correct.  The patient was

transferred to the recovery room in stable condition.

 

 

BINH GÓMEZ

DD:  04/28/2021 09:53:29

DT:  04/28/2021 17:14:07

Job #:  [id number]

## 2021-06-20 ENCOUNTER — HOSPITAL ENCOUNTER (EMERGENCY)
Dept: HOSPITAL 56 - MW.ED | Age: 41
Discharge: HOME | End: 2021-06-20
Payer: COMMERCIAL

## 2021-06-20 DIAGNOSIS — J02.0: Primary | ICD-10-CM

## 2021-06-20 PROCEDURE — 99282 EMERGENCY DEPT VISIT SF MDM: CPT

## 2021-06-20 NOTE — EDM.PDOC
ED HPI GENERAL MEDICAL PROBLEM





- General


Chief Complaint: ENT Problem


Stated Complaint: SORE THROAT


Time Seen by Provider: 06/20/21 07:30





- History of Present Illness


INITIAL COMMENTS - FREE TEXT/NARRATIVE: 





CHIEF COMPLAINT(S): "I think I have strep throat."








HISTORY OF PRESENT ILLNESS: This is a 40-year-old man without any significant 

past medical history who comes to the emergency department with a chief 

complaint of "I think I have strep throat."  The patient states that for the 

last 2 days he has been experiencing a sore throat and it is painful when he 

swallows.  He denies any fevers, chills, runny nose, congestion, or cough.  He 

denies any drooling, trismus or voice changes.  He rates his pain as 7-8 out of 

10 and is worse when he swallows.  It is an achy pain.  His pain is aggravated 

by swallowing.  Relieving factor was DayQuil yesterday.  He states that he has 

had strep throat in the past and it feels very similar.


 


REVIEW OF SYSTEMS: 





Constitutional: Denies fever, chills.


Eyes: Denies eye pain


Ears, Nose, Mouth, & Throat: Positive for sore throat.  Denies earache, runny 

nose, congestion, trismus, drooling, voice changes


Cardiovascular: Denies chest pain


Respiratory: Denies shortness of breath


Gastrointestinal: Denies Nausea, vomiting, diarrhea, hematochezia.


Genitourinary: Denies hematuria


Skin:Denies a rash


MSK: Denies joint pain


Neurological: Denies blurred vision


Psychiatric: Denies depression








PAST MEDICAL HISTORY: As per history of present illness and as reviewed below 

otherwise noncontributory.





SURGICAL HISTORY: As per history of present illness and as reviewed below 

otherwise noncontributory.





SOCIAL HISTORY: As per history of present illness and as reviewed below 

otherwise noncontributory.





FAMILY HISTORY: As per history of present illness and as reviewed below 

otherwise noncontributory.








EXAMINATION OF ORGAN SYSTEMS/BODY AREAS: 





Constitutional: Blood pressure is 124/80, heart rate 75 respiratory rate 17 with

an oxygen saturation of 97% on room air.  Temperature 36.4.


General: Well-appearing man who is in no acute distress sitting comfortably in a

chair


Psychiatric: Appropriate mood and affect.


Eyes: No scleral icterus or conjunctival erythema


ENMT: Moist mucous membranes.  There is mild pharyngeal erythema.  There is 

tonsillar exudates and swelling.  Uvula is midline.  No trismus no drooling.  No

stridor.  Bilateral tympanic membranes and nasal turbinates without any 

abnormalities.


Cardiovascular: Regular, rate, and rhythm.  No gallops, murmurs, or rubs.  

Bilateral upper extremity pulses symmetric and intact.  No peripheral edema.  No

JVD.


Respiratory: Lungs clear to auscultation bilaterally.  No wheezes, rales, or 

rhonchi.


Neurological:     Alert, GCS 15








MEDICAL DECISION MAKING AND COURSE IN THE ED WITH INTERPRETATION/REVIEW OF 

DIAGNOSTIC STUDIES: This is a 40-year-old man and with a past medical history of

prior strep pharyngitis who comes to the emergency department with sore throat 

for 2 days who has tonsillar exudates, swelling without any red flag symptoms 

such as trismus, drooling or stridor.  The patient does not have a cough.  I do 

not believe any swab is indicated we will treat prophylactically for strep 

pharyngitis.  We will provide the patient with amoxicillin 1000 mg here and then

he will  a prescription tomorrow for 500 mg amoxicillin for 10 days.  I 

did discuss strict return precautions with the patient.  He was amenable to 

discharge at this time and had no further questions





DISPOSITION: The patient was discharged home in stable condition. The patient 

will follow up with primary care physician in 5 to 7 days





CONDITION: Fair





PROCEDURES: None





FINAL IMPRESSION(S)/DIAGNOSES: 





Acute pharyngitis, likely strep a pharyngitis





 





Nadeem Long M.D.


  ** Throat


Pain Score (Numeric/FACES): 5





- Related Data


                                    Allergies











Allergy/AdvReac Type Severity Reaction Status Date / Time


 


No Known Allergies Allergy   Verified 06/20/21 07:19











Home Meds: 


                                    Home Meds





Amoxicillin 500 mg PO BID #18 tab 06/20/21 [Rx]











Past Medical History





- Past Health History


Medical/Surgical History: Denies Medical/Surgical History


HEENT History: Reports: None


Cardiovascular History: Reports: None


Respiratory History: Reports: None


Gastrointestinal History: Reports: Other (See Below)


Other Gastrointestinal History: symptomatic cholelithiasis


Genitourinary History: Reports: None


Musculoskeletal History: Reports: None


Neurological History: Reports: None


Psychiatric History: Reports: None


Endocrine/Metabolic History: Reports: None


Hematologic History: Reports: None


Immunologic History: Reports: None


Oncologic (Cancer) History: Reports: None


Dermatologic History: Reports: None





- Infectious Disease History


Infectious Disease History: Reports: None





- Past Surgical History


Head Surgeries/Procedures: Reports: None


HEENT Surgical History: Reports: Oral Surgery


Other HEENT Surgeries/Procedures: wisdom teeth extraction


Cardiovascular Surgical History: Reports: None


Respiratory Surgical History: Reports: None


GI Surgical History: Reports: None


Male  Surgical History: Reports: None


Endocrine Surgical History: Reports: None


Neurological Surgical History: Reports: None


Musculoskeletal Surgical History: Reports: None


Oncologic Surgical History: Reports: None


Dermatological Surgical History: Reports: None





Social & Family History





- Family History


Family Medical History: No Pertinent Family History





- Caffeine Use


Caffeine Use: Reports: Coffee, Energy Drinks





- Recreational Drug Use


Recreational Drug Use: No





ED ROS ENT





- Review of Systems


Review Of Systems: See Below





ED EXAM, ENT





- Physical Exam


Exam: See Below





Course





- Vital Signs


Last Recorded V/S: 


                                Last Vital Signs











Temp  36.4 C   06/20/21 07:20


 


Pulse  79   06/20/21 07:40


 


Resp  16   06/20/21 07:40


 


BP  124/80   06/20/21 07:20


 


Pulse Ox  99   06/20/21 07:40














- Orders/Labs/Meds


Meds: 


Medications














Discontinued Medications














Generic Name Dose Route Start Last Admin





  Trade Name Shahid  PRN Reason Stop Dose Admin


 


Amoxicillin  1,000 mg  06/20/21 07:27  06/20/21 07:37





  Amoxicillin 500 Mg Cap  PO  06/20/21 07:28  1,000 mg





  ONETIME ONE   Administration


 


Dexamethasone  8 mg  06/20/21 07:28  06/20/21 07:37





  Dexamethasone 4 Mg Tab  PO  06/20/21 07:29  8 mg





  ONETIME ONE   Administration














Departure





- Departure


Time of Disposition: 07:30


Disposition: Home, Self-Care 01


Condition: Fair


Clinical Impression: 


 Strep pharyngitis








- Discharge Information


Prescriptions: 


Amoxicillin 500 mg PO BID #18 tab


Instructions:  Strep Throat, Adult


Referrals: 


PCP,None [Primary Care Provider] - 


Forms:  ED Department Discharge


Additional Instructions: 


You were evaluated today on an emergent basis.  At this time we did not elect to

do a swab and will just treat you prophylactically.  I do recommend you complete

the course of antibiotics which is amoxicillin 500 mg twice a day for the next 

10 days.  I do recommend Motrin for pain relief which is 400 mg every 6 hours.  

If you have any worsening symptoms such as inability to open mouth, change in 

your voice, fever, or excessive drooling I would like you to return to the 

emergency department.  Please follow-up with primary care physician in 1 week.  

If you do not have a primary care physician please contact one of the numbers 

below to schedule an appointment.





[facility]                                              

 


[address] [place] [address]                                                             

               


Phone: [phone]                                                           

            


Fax: [phone]    





[facility]


[address] [place] [address]                                                             

               


Phone: [phone]                                                           

                                


 Fax: [phone]





The patient is informed of any results of their evaluation and diagnostic workup

and all questions are answered. They are given discharge instructions and return

precautions. The patient is stable for discharge.  The patient states they 

understand and agree with the plan and that they will return if their symptoms 

get worse or if they have any new concerns.





The following information is given to patients seen in the emergency department 

who are being discharged to home. This information is to outline your options 

for follow-up care. We provide all patients seen in our emergency department 

with a follow-up referral.





The need for follow-up, as well as the timing and circumstances, are variable 

depending upon the specifics of your emergency department visit.





If you don't have a primary care physician on staff, we will provide you with a 

referral. We always advise you to contact your personal physician following an 

emergency department visit to inform them of the circumstance of the visit and 

for follow-up with them and/or the need for any referrals to a consulting 

specialist.





The emergency department will also refer you to a specialist when appropriate. 

This referral assures that you have the opportunity for follow-up care with a 

specialist. All of these measure are taken in an effort to provide you with 

optimal care, which includes your follow-up.





Under all circumstances we always encourage you to contact your private 

physician who remains a resource for coordinating your care. When calling for f

ollow-up care, please make the office aware that this follow-up is from your 

recent emergency room visit. If for any reason you are refused follow-up, please

contact the [facility] Emergency Department

at [phone] and asked to speak to the emergency department charge nurse.











Sepsis Event Note (ED)





- Evaluation


Sepsis Screening Result: No Definite Risk





- Focused Exam


Vital Signs: 


                                   Vital Signs











  Temp Pulse Resp BP Pulse Ox


 


 06/20/21 07:40   79  16   99


 


 06/20/21 07:20  36.4 C  75  17  124/80  97

## 2022-10-16 ENCOUNTER — HOSPITAL ENCOUNTER (EMERGENCY)
Dept: HOSPITAL 56 - MW.ED | Age: 42
Discharge: HOME | End: 2022-10-16
Payer: COMMERCIAL

## 2022-10-16 DIAGNOSIS — L01.00: ICD-10-CM

## 2022-10-16 DIAGNOSIS — J30.2: Primary | ICD-10-CM

## 2022-10-16 PROCEDURE — 99282 EMERGENCY DEPT VISIT SF MDM: CPT

## 2022-10-16 RX ADMIN — AMOXICILLIN AND CLAVULANATE POTASSIUM ONE TAB: 875; 125 TABLET, FILM COATED ORAL at 11:27
